# Patient Record
Sex: FEMALE | Race: WHITE | URBAN - METROPOLITAN AREA
[De-identification: names, ages, dates, MRNs, and addresses within clinical notes are randomized per-mention and may not be internally consistent; named-entity substitution may affect disease eponyms.]

---

## 2019-07-29 ENCOUNTER — HISTORICAL (OUTPATIENT)
Dept: ADMINISTRATIVE | Facility: HOSPITAL | Age: 24
End: 2019-07-29

## 2019-07-29 LAB
ERYTHROCYTE [DISTWIDTH] IN BLOOD BY AUTOMATED COUNT: 12.7 % (ref 11.5–17)
GLUCOSE 1H P 100 G GLC PO SERPL-MCNC: 103 MG/DL (ref 100–180)
GROUP & RH: NORMAL
HCT VFR BLD AUTO: 34.9 % (ref 37–47)
HGB BLD-MCNC: 12.2 GM/DL (ref 12–16)
HIV 1+2 AB+HIV1 P24 AG SERPL QL IA: NEGATIVE
MCH RBC QN AUTO: 33.7 PG (ref 27–31)
MCHC RBC AUTO-ENTMCNC: 35 GM/DL (ref 33–36)
MCV RBC AUTO: 96.4 FL (ref 80–94)
PLATELET # BLD AUTO: 150 X10(3)/MCL (ref 130–400)
PMV BLD AUTO: 11.4 FL (ref 9.4–12.4)
RBC # BLD AUTO: 3.62 X10(6)/MCL (ref 4.2–5.4)
T PALLIDUM AB SER QL: NORMAL
WBC # SPEC AUTO: 9.3 X10(3)/MCL (ref 4.5–11.5)

## 2019-08-01 ENCOUNTER — HISTORICAL (OUTPATIENT)
Dept: ADMINISTRATIVE | Facility: HOSPITAL | Age: 24
End: 2019-08-01

## 2019-08-01 LAB — PRODUCT READY: NORMAL

## 2024-09-12 ENCOUNTER — HOSPITAL ENCOUNTER (EMERGENCY)
Facility: HOSPITAL | Age: 29
Discharge: HOME | End: 2024-09-12
Attending: EMERGENCY MEDICINE
Payer: MEDICAID

## 2024-09-12 VITALS
OXYGEN SATURATION: 100 % | HEIGHT: 63 IN | DIASTOLIC BLOOD PRESSURE: 72 MMHG | RESPIRATION RATE: 18 BRPM | WEIGHT: 108 LBS | SYSTOLIC BLOOD PRESSURE: 117 MMHG | HEART RATE: 75 BPM | BODY MASS INDEX: 19.14 KG/M2 | TEMPERATURE: 98 F

## 2024-09-12 DIAGNOSIS — K12.0 APHTHOUS ULCER: Primary | ICD-10-CM

## 2024-09-12 LAB — STREP A PCR (OHS): NOT DETECTED

## 2024-09-12 PROCEDURE — 99283 EMERGENCY DEPT VISIT LOW MDM: CPT

## 2024-09-12 PROCEDURE — 87651 STREP A DNA AMP PROBE: CPT | Performed by: EMERGENCY MEDICINE

## 2024-09-13 NOTE — ED PROVIDER NOTES
Encounter Date: 2024       History     Chief Complaint   Patient presents with    Mouth Lesions     Pt reports mouth lesions popping up 3 days ago w/o fever. Hx of fever blisters. Reports HA, Sore throats and swollen lymph nodes. Reports dx w/ covid a week ago.      See MDM    The history is provided by the patient. No  was used.     Review of patient's allergies indicates:  No Known Allergies  Past Medical History:   Diagnosis Date    Known health problems: none      Past Surgical History:   Procedure Laterality Date     SECTION      HYSTERECTOMY      SALPINGECTOMY      TONSILLECTOMY       No family history on file.  Social History     Tobacco Use    Smoking status: Never    Smokeless tobacco: Never   Substance Use Topics    Alcohol use: Not Currently    Drug use: Not Currently     Review of Systems   Constitutional:  Negative for fever.   Respiratory:  Negative for cough and shortness of breath.    Cardiovascular:  Negative for chest pain.   Gastrointestinal:  Negative for abdominal pain.   Genitourinary:  Negative for difficulty urinating and dysuria.   Musculoskeletal:  Negative for gait problem.   Skin:  Negative for color change.   Neurological:  Negative for dizziness, speech difficulty and headaches.   Psychiatric/Behavioral:  Negative for hallucinations and suicidal ideas.    All other systems reviewed and are negative.      Physical Exam     Initial Vitals [24]   BP Pulse Resp Temp SpO2   117/72 75 18 98.3 °F (36.8 °C) 100 %      MAP       --         Physical Exam    HENT:   Mouth ulcers           ED Course   Procedures  Labs Reviewed   STREP GROUP A BY PCR - Normal       Result Value    STREP A PCR ([facility]) Not Detected      Narrative:     The Xpert Xpress Strep A test is a rapid, qualitative in vitro diagnostic test for the detection of Streptococcus pyogenes (Group A ß-hemolytic Streptococcus, Strep A) in throat swab specimens from patients with signs and  symptoms of pharyngitis.            Imaging Results    None          Medications - No data to display  Medical Decision Making  Historian:  Patient.  Patient is a White 29 y.o. female that presents with mouth ulcers that has been present 3 days. Associated symptoms nothing. Surrounding information is nothing. Exacerbated by nothing. Relieved by nothing. Patient treatment prior to arrival nothing. Risk factors include nothing. Other history pertaining to this complaint nothing.   Assessment:  See physical exam.  DD:  Aphthous ulcers, , strep  ED Course: History was obtained.  Physical was performed.  Patient reports have aphthous ulcers.  Will put her on magic mouthwash. Medical or surgical consults:  None. Social determinants that affect healthcare:  None.       Amount and/or Complexity of Data Reviewed  Labs:      Details: Negative strep                                      Clinical Impression:  Final diagnoses:  [K12.0] Aphthous ulcer (Primary)          ED Disposition Condition    Discharge Stable          ED Prescriptions       Medication Sig Dispense Start Date End Date Auth. Provider    (Magic mouthwash) 1:1:1 diphenhydrAMINE(Benadryl) 12.5mg/5ml liq, aluminum & magnesium hydroxide-simethicone (Maalox), LIDOcaine viscous 2% Swish and spit 10 mLs every 4 (four) hours as needed (Mouth pain). for mouth sores 180 mL 9/12/2024 -- Raleigh Cedeño FNP          Follow-up Information    None          Raleigh Cedeño FNP  09/12/24 [id number]

## 2024-09-24 PROCEDURE — 84443 ASSAY THYROID STIM HORMONE: CPT | Performed by: FAMILY MEDICINE

## 2024-09-24 PROCEDURE — 82306 VITAMIN D 25 HYDROXY: CPT | Performed by: FAMILY MEDICINE

## 2024-09-24 PROCEDURE — 80053 COMPREHEN METABOLIC PANEL: CPT | Performed by: FAMILY MEDICINE

## 2024-09-24 PROCEDURE — 80061 LIPID PANEL: CPT | Performed by: FAMILY MEDICINE

## 2024-09-24 PROCEDURE — 85025 COMPLETE CBC W/AUTO DIFF WBC: CPT | Performed by: FAMILY MEDICINE

## 2024-09-24 PROCEDURE — 83036 HEMOGLOBIN GLYCOSYLATED A1C: CPT | Performed by: FAMILY MEDICINE

## 2024-09-30 ENCOUNTER — OFFICE VISIT (OUTPATIENT)
Dept: FAMILY MEDICINE | Facility: CLINIC | Age: 29
End: 2024-09-30
Payer: MEDICAID

## 2024-09-30 VITALS
BODY MASS INDEX: 20.34 KG/M2 | HEART RATE: 70 BPM | SYSTOLIC BLOOD PRESSURE: 99 MMHG | WEIGHT: 114.81 LBS | TEMPERATURE: 98 F | DIASTOLIC BLOOD PRESSURE: 65 MMHG | OXYGEN SATURATION: 97 % | RESPIRATION RATE: 18 BRPM | HEIGHT: 63 IN

## 2024-09-30 DIAGNOSIS — R79.89 LOW VITAMIN D LEVEL: Primary | ICD-10-CM

## 2024-09-30 DIAGNOSIS — D53.8 OTHER SPECIFIED NUTRITIONAL ANEMIAS: ICD-10-CM

## 2024-09-30 PROBLEM — D64.89 OTHER SPECIFIED ANEMIAS: Status: ACTIVE | Noted: 2024-09-30

## 2024-09-30 PROCEDURE — 1160F RVW MEDS BY RX/DR IN RCRD: CPT | Mod: CPTII,,, | Performed by: FAMILY MEDICINE

## 2024-09-30 PROCEDURE — 99214 OFFICE O/P EST MOD 30 MIN: CPT | Mod: ,,, | Performed by: FAMILY MEDICINE

## 2024-09-30 PROCEDURE — 1159F MED LIST DOCD IN RCRD: CPT | Mod: CPTII,,, | Performed by: FAMILY MEDICINE

## 2024-09-30 PROCEDURE — 3074F SYST BP LT 130 MM HG: CPT | Mod: CPTII,,, | Performed by: FAMILY MEDICINE

## 2024-09-30 PROCEDURE — 3008F BODY MASS INDEX DOCD: CPT | Mod: CPTII,,, | Performed by: FAMILY MEDICINE

## 2024-09-30 PROCEDURE — 3044F HG A1C LEVEL LT 7.0%: CPT | Mod: CPTII,,, | Performed by: FAMILY MEDICINE

## 2024-09-30 PROCEDURE — 3078F DIAST BP <80 MM HG: CPT | Mod: CPTII,,, | Performed by: FAMILY MEDICINE

## 2024-09-30 NOTE — PROGRESS NOTES
Family Medicine    Patient ID: [id number]     Chief Complaint: Follow-up (Lab results )      HPI:     Adeline Mcdonald is a 29 y.o. female here today for a follow up.     Past Medical History:   Diagnosis Date    GERD (gastroesophageal reflux disease)     Hypothyroidism, unspecified     Temporomandibular joint disorder (TMJ)         Past Surgical History:   Procedure Laterality Date     SECTION      x2    HYSTERECTOMY  2022    partial    SALPINGECTOMY      TONSILLECTOMY AND ADENOIDECTOMY  2013        Social History     Tobacco Use    Smoking status: Every Day     Types: Cigarettes, Vaping w/o nicotine    Smokeless tobacco: Never   Substance and Sexual Activity    Alcohol use: Not Currently    Drug use: Not Currently    Sexual activity: Not on file        Current Outpatient Medications   Medication Instructions    (Magic mouthwash) 1:1:1 diphenhydrAMINE(Benadryl) 12.5mg/5ml liq, aluminum & magnesium hydroxide-simethicone (Maalox), LIDOcaine viscous 2% 10 mLs, Swish & Spit, Every 4 hours PRN, for mouth sores       Review of patient's allergies indicates:   Allergen Reactions    Sertraline      Other Reaction(s): suicide ideation    Cefprozil Itching and Rash        Patient Care Team:  Carl Castro Sr., MD as PCP - General (Family Medicine)  Tavares Cao MD as Consulting Physician (Rheumatology)  Donell Bustillos DO (Orthopedic Surgery)     Subjective:     Review of Systems   Constitutional:  Positive for activity change and unexpected weight change.   HENT:  Negative for hearing loss, rhinorrhea and trouble swallowing.    Eyes:  Negative for discharge and visual disturbance.   Respiratory:  Negative for chest tightness and wheezing.    Cardiovascular:  Negative for chest pain and palpitations.   Gastrointestinal:  Negative for blood in stool, constipation, diarrhea and vomiting.   Endocrine: Negative for polydipsia and polyuria.   Genitourinary:  Negative for difficulty  "urinating, dysuria, hematuria and menstrual problem.   Musculoskeletal:  Negative for arthralgias, joint swelling and neck pain.   Neurological:  Negative for weakness and headaches.   Psychiatric/Behavioral:  Negative for confusion and dysphoric mood.        12 point review of systems conducted, negative except as stated in the history of present illness. See HPI for details.    Objective:     Visit Vitals  BP 99/65 (BP Location: Left arm, Patient Position: Sitting)   Pulse 70   Temp 97.9 °F (36.6 °C)   Resp 18   Ht 5' 3" (1.6 m)   Wt 52.1 kg (114 lb 12.8 oz)   SpO2 97%   BMI 20.34 kg/m²       Physical Exam    Labs Reviewed:     Chemistry:  Lab Results   Component Value Date     09/24/2024    K 4.1 09/24/2024    BUN 16.6 09/24/2024    CREATININE 0.76 09/24/2024    EGFRNORACEVR >60 09/24/2024    GLUCOSE 83 09/24/2024    CALCIUM 9.0 09/24/2024    ALKPHOS 64 09/24/2024    LABPROT 6.9 09/24/2024    ALBUMIN 4.0 09/24/2024    BILIDIR 0.10 10/08/2019    IBILI 0.40 10/08/2019    AST 27 09/24/2024    ALT 38 09/24/2024    PHOS 3.2 10/08/2019    YVKEXAPH35XV 54 09/24/2024    TSH 1.894 09/24/2024        Lab Results   Component Value Date    HGBA1C 4.4 09/24/2024        Hematology:  Lab Results   Component Value Date    WBC 5.64 09/24/2024    HGB 13.7 09/24/2024    HCT 39.8 09/24/2024     09/24/2024       Lipid Panel:  Lab Results   Component Value Date    CHOL 121 09/24/2024    HDL 53 09/24/2024    LDL 59.00 09/24/2024    TRIG 47 09/24/2024    TOTALCHOLEST 2 09/24/2024        Urine:  Lab Results   Component Value Date    APPEARANCEUA CLEAR 10/04/2019    SGUA 1.015 07/17/2023    PROTEINUA Negative 07/17/2023    NITRITESUA Negative 07/17/2023    LEUKOCYTESUR Negative 07/17/2023    RBCUA NONE SEEN 10/04/2019    WBCUA NONE SEEN 10/04/2019    BACTERIA NONE SEEN 10/04/2019        Assessment:       ICD-10-CM ICD-9-CM   1. Low vitamin D level  R79.89 790.6   2. Other specified nutritional anemias  D53.8 281.8        Plan: "     1. Low vitamin D level  Overview:  Background:  Vitamin-D level today:  Fifty-four  Preferred range per [employer] is 40-60 ng/mL (Ref)  Current supplementation:  Unknown    Plan:  Continue current supplementation  Repeat vitamin-D in one year      2. Other specified nutritional anemias  Overview:  History of anemia 2020, unknown cause    Assessment & Plan:  Fully resolved  Repeat CBC 1 year       Declines all vaccines  Mild weight loss then weight gain attributed to death in the family    No follow-ups on file. In addition to their scheduled follow up, the patient has also been instructed to follow up on as needed basis.     No future appointments.     Reg Garcia MD